# Patient Record
Sex: FEMALE | URBAN - METROPOLITAN AREA
[De-identification: names, ages, dates, MRNs, and addresses within clinical notes are randomized per-mention and may not be internally consistent; named-entity substitution may affect disease eponyms.]

---

## 2021-06-16 ENCOUNTER — APPOINTMENT (RX ONLY)
Dept: URBAN - METROPOLITAN AREA CLINIC 55 | Facility: CLINIC | Age: 46
Setting detail: DERMATOLOGY
End: 2021-06-16

## 2021-06-16 DIAGNOSIS — Z41.9 ENCOUNTER FOR PROCEDURE FOR PURPOSES OTHER THAN REMEDYING HEALTH STATE, UNSPECIFIED: ICD-10-CM

## 2021-06-16 PROCEDURE — ? COSMETIC CONSULTATION: GENERAL

## 2021-06-16 PROCEDURE — ? BOTOX

## 2021-06-16 NOTE — PROCEDURE: BOTOX
Lot #: [id number]
Show Levator Superior Units: Yes
Additional Area 2 Units: 0
Show Ucl Units: No
Glabellar Complex Units: 20
Additional Area 2 Location: upper lip
Additional Area 3 Location: Chin
Consent: Written consent obtained. Risks include but not limited to lid/brow ptosis, bruising, swelling, diplopia, temporary effect, incomplete chemical denervation.
Dilution (U/0.1 Cc): 4
Additional Area 1 Location: Lateral Brows
Periorbital Skin Units: 16
Detail Level: Detailed
Forehead Units: 5
Expiration Date (Month Year): 9/2023
Post-Care Instructions: Patient instructed to not lie down for 4 hours and limit physical activity for 24 hours.

## 2021-08-24 ENCOUNTER — APPOINTMENT (RX ONLY)
Dept: URBAN - METROPOLITAN AREA CLINIC 55 | Facility: CLINIC | Age: 46
Setting detail: DERMATOLOGY
End: 2021-08-24

## 2021-08-24 DIAGNOSIS — Z41.9 ENCOUNTER FOR PROCEDURE FOR PURPOSES OTHER THAN REMEDYING HEALTH STATE, UNSPECIFIED: ICD-10-CM

## 2021-08-24 PROCEDURE — ? BOTOX

## 2021-08-24 NOTE — PROCEDURE: BOTOX
L Brow Units: 0
Expiration Date (Month Year): 11/2023
Show Anterior Platysmal Band Units: Yes
Show Ucl Units: No
Additional Area 3 Units: 4
Consent: Written consent obtained. Risks include but not limited to lid/brow ptosis, bruising, swelling, diplopia, temporary effect, incomplete chemical denervation.
Detail Level: Detailed
Additional Area 1 Location: Left Lateral Brow
Glabellar Complex Units: 24
Post-Care Instructions: Patient instructed to not lie down for 4 hours and limit physical activity for 24 hours.
Additional Area 3 Location: Chin
Additional Area 2 Location: Upper lip
Periorbital Skin Units: 20
Forehead Units: 5
Lot #: [id number]

## 2021-12-02 ENCOUNTER — APPOINTMENT (RX ONLY)
Dept: URBAN - METROPOLITAN AREA CLINIC 55 | Facility: CLINIC | Age: 46
Setting detail: DERMATOLOGY
End: 2021-12-02

## 2021-12-02 DIAGNOSIS — Z41.9 ENCOUNTER FOR PROCEDURE FOR PURPOSES OTHER THAN REMEDYING HEALTH STATE, UNSPECIFIED: ICD-10-CM

## 2021-12-02 PROCEDURE — ? BOTOX

## 2021-12-02 PROCEDURE — ? ICON IPL

## 2021-12-02 ASSESSMENT — LOCATION SIMPLE DESCRIPTION DERM: LOCATION SIMPLE: NOSE

## 2021-12-02 ASSESSMENT — LOCATION ZONE DERM: LOCATION ZONE: NOSE

## 2021-12-02 ASSESSMENT — LOCATION DETAILED DESCRIPTION DERM: LOCATION DETAILED: NASAL DORSUM

## 2021-12-02 NOTE — PROCEDURE: BOTOX
Forehead Units: 5
Show Additional Area 2: Yes
R Brow Units: 0
Additional Area 3 Units: 4
Show Ucl Units: No
Expiration Date (Month Year): 3/2024
Additional Area 4 Location: upper cutaneous lip
Periorbital Skin Units: 20
Glabellar Complex Units: 24
Consent: Written consent obtained. Risks include but not limited to lid/brow ptosis, bruising, swelling, diplopia, temporary effect, incomplete chemical denervation.
Additional Area 2 Location: Upper lip
Additional Area 1 Location: Left Lateral Brow
Lot #: [id number]
Additional Area 3 Location: Chin
Detail Level: Detailed
Post-Care Instructions: Patient instructed to not lie down for 4 hours and limit physical activity for 24 hours.

## 2021-12-02 NOTE — PROCEDURE: ICON IPL
Contact: Light
Passes: 1
Consent: Verbal consent obtained, risks reviewed including but not limited to crusting, scabbing, blistering, scarring, darker or lighter pigmentary change, bruising, and/or incomplete response.
Energy (Optional- Include Units): 100 J/cm2 / 60 ms
Detail Level: Zone
Pre-Procedure Text: After consent was obtained, the treatment areas were cleansed and treated using the parameters mentioned above.
Energy (Optional- Include Units): 44 J/cm2 / 20 ms
Post-Care Instructions: I reviewed with the patient in detail post-care instructions. Patient should stay away from the sun and wear sun protection until treated areas are fully healed.
External Cooling Fan Speed: 5
Anesthesia Volume In Cc: 0
Anesthesia Type: 1% lidocaine with epinephrine

## 2022-04-01 ENCOUNTER — APPOINTMENT (RX ONLY)
Dept: URBAN - METROPOLITAN AREA CLINIC 55 | Facility: CLINIC | Age: 47
Setting detail: DERMATOLOGY
End: 2022-04-01

## 2022-04-01 DIAGNOSIS — Z41.9 ENCOUNTER FOR PROCEDURE FOR PURPOSES OTHER THAN REMEDYING HEALTH STATE, UNSPECIFIED: ICD-10-CM

## 2022-04-01 PROCEDURE — ? FILLERS

## 2022-04-01 PROCEDURE — ? BOTOX

## 2022-04-01 NOTE — PROCEDURE: BOTOX
Show Orbicularis Oculi Units: Yes
Nasal Root Units: 0
Show Lcl Units: No
Expiration Date (Month Year): 3/2024
Additional Area 1 Location: Left Lateral Brow
Detail Level: Detailed
Additional Area 2 Location: Upper lip
Additional Area 4 Location: upper cutaneous lip
Additional Area 3 Location: Chin
Forehead Units: 5
Post-Care Instructions: Patient instructed to not lie down for 4 hours and limit physical activity for 24 hours.
Lot #: [id number]
Consent: Written consent obtained. Risks include but not limited to lid/brow ptosis, bruising, swelling, diplopia, temporary effect, incomplete chemical denervation.
Additional Area 3 Units: 4
Periorbital Skin Units: 20
Glabellar Complex Units: 24

## 2022-04-01 NOTE — PROCEDURE: FILLERS
Nasolabial Folds Filler Volume In Cc: 0
Use Map Statement For Sites (Optional): No
Include Cannula Size?: 27G
Include Cannula Information In Note?: Yes
Expiration Date (Month Year): 5/31/2024
Expiration Date (Month Year): 8/31/2024
Additional Area 1 Location: lip body
Lot #: [id number]
Lot #: [id number]
Filler: Restylane Defyne
Anesthesia Volume In Cc: 0.2
Anesthesia Type: 1% lidocaine with epinephrine
Expiration Date (Month Year): 2/28/2023
Consent: Verbal consent obtained. Risks include but not limited to bruising, beading, irregular texture, ulceration, infection, allergic reaction, scar formation, incomplete augmentation, temporary nature, procedural pain.
Additional Anesthesia Volume In Cc: 6
Post-Care Instructions: Patient instructed to apply ice to reduce swelling.
Lot #: [id number]
Detail Level: Detailed
Filler: Restylane Contour
Map Statment: See Attach Map for Complete Details

## 2022-06-28 ENCOUNTER — APPOINTMENT (RX ONLY)
Dept: URBAN - METROPOLITAN AREA CLINIC 55 | Facility: CLINIC | Age: 47
Setting detail: DERMATOLOGY
End: 2022-06-28

## 2022-06-28 DIAGNOSIS — Z41.9 ENCOUNTER FOR PROCEDURE FOR PURPOSES OTHER THAN REMEDYING HEALTH STATE, UNSPECIFIED: ICD-10-CM

## 2022-06-28 PROCEDURE — ? FILLERS

## 2022-06-28 PROCEDURE — ? BOTOX

## 2022-06-28 NOTE — PROCEDURE: BOTOX
[facility] Units: 0
Show Right And Left Pupillary Line Units: No
Show Lateral Platysmal Band Units: Yes
Consent: Written consent obtained. Risks include but not limited to lid/brow ptosis, bruising, swelling, diplopia, temporary effect, incomplete chemical denervation.
Additional Area 1 Location: Right Lateral Brow
Additional Area 3 Units: 4
Periorbital Skin Units: 20
Detail Level: Detailed
Post-Care Instructions: Patient instructed to not lie down for 4 hours and limit physical activity for 24 hours.
Expiration Date (Month Year): 4/2024
Additional Area 4 Location: upper lip
Additional Area 3 Location: Chin
Glabellar Complex Units: 24
Lot #: [id number]
Additional Area 5 Location: lower eye lids
Forehead Units: 5

## 2022-06-28 NOTE — PROCEDURE: FILLERS
Lot #: [id number]
Additional Area 4 Volume In Cc: 0
Post-Care Instructions: Patient instructed to apply ice to reduce swelling.
Include Cannula Information In Note?: Yes
Anesthesia Volume In Cc: 0.5
Include Cannula Size?: 27G
Map Statment: See Attach Map for Complete Details
Lot #: [id number]
Aspiration Statement: Aspiration was performed prior to injecting site with filler.
Expiration Date (Month Year): 11/11/2022
Lot #: [id number]
Additional Anesthesia Volume In Cc: 6
Consent: Verbal consent obtained. Risks include but not limited to bruising, beading, irregular texture, ulceration, infection, allergic reaction, scar formation, incomplete augmentation, temporary nature, procedural pain.
Use Map Statement For Sites (Optional): No
Anesthesia Type: 1% lidocaine with epinephrine
Expiration Date (Month Year): 5/31/2024
Detail Level: Detailed
Expiration Date (Month Year): 9/30/2024
Filler: Restylane Kysse
Additional Area 1 Location: lip body

## 2022-08-25 ENCOUNTER — APPOINTMENT (RX ONLY)
Dept: URBAN - METROPOLITAN AREA CLINIC 55 | Facility: CLINIC | Age: 47
Setting detail: DERMATOLOGY
End: 2022-08-25

## 2022-08-25 DIAGNOSIS — Z41.9 ENCOUNTER FOR PROCEDURE FOR PURPOSES OTHER THAN REMEDYING HEALTH STATE, UNSPECIFIED: ICD-10-CM

## 2022-08-25 PROCEDURE — ? BOTOX

## 2022-12-09 ENCOUNTER — APPOINTMENT (RX ONLY)
Dept: URBAN - METROPOLITAN AREA CLINIC 55 | Facility: CLINIC | Age: 47
Setting detail: DERMATOLOGY
End: 2022-12-09

## 2022-12-09 DIAGNOSIS — Z41.9 ENCOUNTER FOR PROCEDURE FOR PURPOSES OTHER THAN REMEDYING HEALTH STATE, UNSPECIFIED: ICD-10-CM

## 2022-12-09 PROCEDURE — ? COSMETIC CONSULTATION: GENERAL

## 2022-12-09 PROCEDURE — ? SCULPTRA

## 2022-12-09 PROCEDURE — ? INVENTORY

## 2022-12-09 PROCEDURE — ? BOTOX

## 2022-12-09 NOTE — PROCEDURE: BOTOX
[facility] Units: 0
Show Additional Area 3: Yes
Forehead Units: 5
Additional Area 1 Location: upper lip
Show Ucl Units: No
Detail Level: Detailed
Glabellar Complex Units: 24
Additional Area 2 Location: chin
Depressor Anguli Oris Units: 6
Show Inventory Tab: Show
Additional Area 2 Units: 4
Consent: Verbal consent obtained. Risks include but not limited to lid/brow ptosis, bruising, swelling, diplopia, temporary effect, incomplete chemical denervation.
Post-Care Instructions: Patient instructed to not lie down for 4 hours and limit physical activity for 24 hours.
Periorbital Skin Units: 20

## 2022-12-09 NOTE — PROCEDURE: SCULPTRA
Show Jawline Volume?: Yes
Treatment Number: 1
Injection Technique: The Sculptra was injected to the areas shown with a 25g cannula after prepping the skin with alcohol and/or chlorhexidine and providing appropriate anesthesia.
Lateral Face Sculptra Filler Volume In Cc: 0
Dilution Method: The Sculptra was reconstituted with 8cc of sterile water and 2cc 2% plain lidocaine for each vial for a total volume of 10 cc for each vial.
Show Right And Left Middle Malar Volume?: No
Consent obtained.
Anesthesia Volume In Cc: 0.5
Detail Level: Detailed
Additional Anesthesia Volume In Cc: 6
Post-Care Instructions: Aftercare instructions were provided to the patient.
Show Inventory Tab: Show
Map Statement: See Attached Map for Complete Details.
Anesthesia Type: 1% lidocaine with epinephrine

## 2023-03-08 ENCOUNTER — APPOINTMENT (RX ONLY)
Dept: URBAN - METROPOLITAN AREA CLINIC 55 | Facility: CLINIC | Age: 48
Setting detail: DERMATOLOGY
End: 2023-03-08

## 2023-03-08 DIAGNOSIS — Z41.9 ENCOUNTER FOR PROCEDURE FOR PURPOSES OTHER THAN REMEDYING HEALTH STATE, UNSPECIFIED: ICD-10-CM

## 2023-03-08 PROCEDURE — ? SCULPTRA

## 2023-03-08 PROCEDURE — ? BOTOX

## 2023-03-08 NOTE — PROCEDURE: SCULPTRA
Show Temples Volume?: Yes
Jawline Sculptra Filler Volume In Cc: 0
Post-Care Instructions: Aftercare instructions were provided to the patient.
Map Statement: See Attached Map for Complete Details.
Show Right And Left Jawline Volume?: No
Vials Reconstituted (Required For Inventory): 1
Dilution Method: The Sculptra was reconstituted with 8cc of sterile water and 2cc 2% plain lidocaine for each vial for a total volume of 10 cc for each vial.
Anesthesia Volume In Cc: 0.5
Injection Technique: The Sculptra was injected to the areas shown with a 25g cannula after prepping the skin with alcohol and/or chlorhexidine and providing appropriate anesthesia.
Detail Level: Detailed
Consent obtained.
Anesthesia Type: 1% lidocaine with epinephrine
Additional Anesthesia Volume In Cc: 6
Show Inventory Tab: Show

## 2023-03-08 NOTE — PROCEDURE: BOTOX
L Brow Units: 0
Show Anterior Platysmal Band Units: Yes
Show Right And Left Periorbital Units: No
Post-Care Instructions: Patient instructed to not lie down for 4 hours and limit physical activity for 24 hours.
Additional Area 2 Units: 4
Additional Area 2 Location: chin
Periorbital Skin Units: 20
Additional Area 1 Location: upper lip
Incrementing Botox Units: By 0.5 Units
Depressor Anguli Oris Units: 6
Detail Level: Detailed
Glabellar Complex Units: 24
Forehead Units: 5
Show Inventory Tab: Show
Consent: Verbal consent obtained. Risks include but not limited to lid/brow ptosis, bruising, swelling, diplopia, temporary effect, incomplete chemical denervation.

## 2023-06-13 ENCOUNTER — APPOINTMENT (RX ONLY)
Dept: URBAN - METROPOLITAN AREA CLINIC 55 | Facility: CLINIC | Age: 48
Setting detail: DERMATOLOGY
End: 2023-06-13

## 2023-06-13 DIAGNOSIS — Z41.9 ENCOUNTER FOR PROCEDURE FOR PURPOSES OTHER THAN REMEDYING HEALTH STATE, UNSPECIFIED: ICD-10-CM

## 2023-06-13 PROCEDURE — ? FILLERS

## 2023-06-13 PROCEDURE — ? INVENTORY

## 2023-06-13 PROCEDURE — ? BOTOX

## 2023-06-13 NOTE — PROCEDURE: FILLERS
Nasolabial Folds Filler Volume In Cc: 0
Filler: Restylane Contour
Post-Care Instructions: After the procedure, patient instructed to apply ice to reduce swelling.
Use Map Statement For Sites (Optional): No
Map Statment: See Attach Map for Complete Details
Filler: Restylane Defyne
Include Cannula Information In Note?: Yes
Anesthesia Type: 1% lidocaine with epinephrine
Anesthesia Volume In Cc: 0.5
Include Cannula Size?: 25G
Additional Anesthesia Volume In Cc: 6
Detail Level: Detailed
Consent: Written consent obtained. Risks include but not limited to bruising, beading, irregular texture, ulceration, infection, allergic reaction, scar formation, incomplete augmentation, temporary nature, and procedural pain.

## 2023-06-13 NOTE — PROCEDURE: BOTOX
Right Periorbital Skin Units: 0
Show Additional Area 5: Yes
Additional Area 1 Location: chin
Incrementing Botox Units: By 0.5 Units
Show Ucl Units: No
Consent: Verbal consent obtained. Risks include but not limited to lid/brow ptosis, bruising, swelling, diplopia, temporary effect, incomplete chemical denervation.
Detail Level: Detailed
Additional Area 1 Units: 4
Additional Area 3 Location: lower lip
Post-Care Instructions: Patient instructed to not lie down for 4 hours and limit physical activity for 24 hours.
Forehead Units: 5
Glabellar Complex Units: 24
Show Inventory Tab: Show
Depressor Anguli Oris Units: 6
Periorbital Skin Units: 20

## 2023-10-26 ENCOUNTER — APPOINTMENT (RX ONLY)
Dept: URBAN - METROPOLITAN AREA CLINIC 55 | Facility: CLINIC | Age: 48
Setting detail: DERMATOLOGY
End: 2023-10-26

## 2023-10-26 DIAGNOSIS — Z41.9 ENCOUNTER FOR PROCEDURE FOR PURPOSES OTHER THAN REMEDYING HEALTH STATE, UNSPECIFIED: ICD-10-CM

## 2023-10-26 PROCEDURE — ? SCULPTRA

## 2023-10-26 PROCEDURE — ? INVENTORY

## 2023-10-26 PROCEDURE — ? DAXXIFY

## 2023-10-26 NOTE — PROCEDURE: SCULPTRA
Show Right And Left Middle Malar Volume?: No
Consent obtained.
Left Forehead Filler Volume In Cc: 0
Show Mental Crease Volume?: Yes
Detail Level: Detailed
Show Inventory Tab: Show
Additional Anesthesia Volume In Cc: 6
Post-Care Instructions: Aftercare instructions were provided to the patient.
Map Statement: See Attached Map for Complete Details.
Vials Reconstituted (Required For Inventory): 1
Dilution Method: The Sculptra was reconstituted with 8cc of sterile water and 2cc 2% plain lidocaine for each vial for a total volume of 10 cc for each vial.
Anesthesia Type: 1% lidocaine with epinephrine
Anesthesia Volume In Cc: 0.5
Injection Technique: The Sculptra was injected to the areas shown with a 25g cannula after prepping the skin with alcohol and/or chlorhexidine and providing appropriate anesthesia.

## 2023-10-26 NOTE — PROCEDURE: DAXXIFY
Show Mentalis Units: No
Show Glabellar Units: Yes
Periorbital Skin Units: 48
[place] Units: 0
Dilution (U/0.1 Cc): 8
Additional Area 1 Location: Chin
Forehead Units: 22
Consent obtained. Risks include but not limited to lid/brow ptosis, bruising, swelling, diplopia, temporary effect, incomplete chemical denervation.
Detail Level: Detailed
Post-Care Instructions: Patient instructed to not lie down for 4 hours and limit physical activity for 24 hours.
Depressor Anguli Oris Units: 12
Show Inventory Tab: Hide

## 2024-05-13 ENCOUNTER — APPOINTMENT (RX ONLY)
Dept: URBAN - METROPOLITAN AREA CLINIC 55 | Facility: CLINIC | Age: 49
Setting detail: DERMATOLOGY
End: 2024-05-13

## 2024-05-13 DIAGNOSIS — Z41.9 ENCOUNTER FOR PROCEDURE FOR PURPOSES OTHER THAN REMEDYING HEALTH STATE, UNSPECIFIED: ICD-10-CM

## 2024-05-13 PROCEDURE — ? INVENTORY

## 2024-05-13 PROCEDURE — ? DAXXIFY

## 2024-05-13 NOTE — PROCEDURE: DAXXIFY
Show Mentalis Units: No
Show Glabellar Units: Yes
Periorbital Skin Units: 48
Masseter Units: 0
Dilution (U/0.1 Cc): 8
Additional Area 1 Location: Chin
Forehead Units: 22
Consent obtained. Risks include but not limited to lid/brow ptosis, bruising, swelling, diplopia, temporary effect, incomplete chemical denervation.
Detail Level: Detailed
Post-Care Instructions: Patient instructed to not lie down for 4 hours and limit physical activity for 24 hours.
Depressor Anguli Oris Units: 12
Show Inventory Tab: Hide
Bill Summary Price Listed Below, Or Bill Total Of Units X Price Per Unit?: Bill Summary Price Below

## 2024-08-20 ENCOUNTER — RX ONLY (OUTPATIENT)
Age: 49
Setting detail: RX ONLY
End: 2024-08-20

## 2024-08-20 ENCOUNTER — APPOINTMENT (RX ONLY)
Dept: URBAN - METROPOLITAN AREA CLINIC 55 | Facility: CLINIC | Age: 49
Setting detail: DERMATOLOGY
End: 2024-08-20

## 2024-08-20 DIAGNOSIS — Z41.9 ENCOUNTER FOR PROCEDURE FOR PURPOSES OTHER THAN REMEDYING HEALTH STATE, UNSPECIFIED: ICD-10-CM

## 2024-08-20 PROCEDURE — ? INVENTORY

## 2024-08-20 PROCEDURE — ? SCULPTRA

## 2024-08-20 PROCEDURE — ? DAXXIFY

## 2024-08-20 NOTE — PROCEDURE: SCULPTRA
Show Fourth Additional Location?: Yes
Injection Technique: The Sculptra was injected to the areas shown with a 25g cannula after prepping the skin with alcohol and/or chlorhexidine and providing appropriate anesthesia.
Left Pa Filler Volume In Cc: 0
Show Right And Left Mmc Volume?: No
Anesthesia Volume In Cc: 0.5
Consent obtained.
Additional Anesthesia Volume In Cc: 6
Map Statement: See Attached Map for Complete Details.
Detail Level: Detailed
Vials Reconstituted (Required For Inventory): 1
Post-Care Instructions: Aftercare instructions were provided to the patient.
Show Inventory Tab: Show
Anesthesia Type: 1% lidocaine with epinephrine
Dilution Method: The Sculptra was reconstituted with 8cc of sterile water and 2cc 2% plain lidocaine for each vial for a total volume of 10 cc for each vial.

## 2024-08-20 NOTE — PROCEDURE: DAXXIFY
L Brow Units: 0
Show Inventory Tab: Hide
Show Periorbital Units: Yes
Consent obtained. Risks include but not limited to lid/brow ptosis, bruising, swelling, diplopia, temporary effect, incomplete chemical denervation.
Show Ucl Units: No
Dilution (U/0.1 Cc): 8
Post-Care Instructions: Patient instructed to not lie down for 4 hours and limit physical activity for 24 hours.
Periorbital Skin Units: 48
Bill Summary Price Listed Below, Or Bill Total Of Units X Price Per Unit?: Bill Summary Price Below
Additional Area 1 Location: chin
Forehead Units: 12
Detail Level: Detailed

## 2024-10-10 ENCOUNTER — APPOINTMENT (RX ONLY)
Dept: URBAN - METROPOLITAN AREA CLINIC 55 | Facility: CLINIC | Age: 49
Setting detail: DERMATOLOGY
End: 2024-10-10

## 2024-10-10 DIAGNOSIS — Z41.9 ENCOUNTER FOR PROCEDURE FOR PURPOSES OTHER THAN REMEDYING HEALTH STATE, UNSPECIFIED: ICD-10-CM

## 2024-10-10 PROCEDURE — ? ICON IPL

## 2024-10-10 PROCEDURE — ? FILLERS

## 2024-10-10 PROCEDURE — ? INVENTORY

## 2024-10-10 ASSESSMENT — LOCATION ZONE DERM
LOCATION ZONE: FACE
LOCATION ZONE: NOSE

## 2024-10-10 ASSESSMENT — LOCATION SIMPLE DESCRIPTION DERM
LOCATION SIMPLE: RIGHT CHEEK
LOCATION SIMPLE: LEFT CHEEK
LOCATION SIMPLE: NOSE

## 2024-10-10 ASSESSMENT — LOCATION DETAILED DESCRIPTION DERM
LOCATION DETAILED: NASAL DORSUM
LOCATION DETAILED: RIGHT INFERIOR CENTRAL MALAR CHEEK
LOCATION DETAILED: LEFT INFERIOR CENTRAL MALAR CHEEK

## 2024-10-10 NOTE — PROCEDURE: ICON IPL
Length Of Topical Anesthesia Application (Optional): 60 minutes
Post-Care Instructions: I reviewed with the patient in detail post-care instructions. Patient should avoid sun exposure and to wear sun protection until treated areas are fully healed.
Energy (Optional- Include Units): 44 J/cm2
External Cooling Fan Speed: 5
Passes: 1
Detail Level: Zone
Anesthesia Volume In Cc: 0
Pulse Duration (Optional- Include Units): 20 ms
Topical Anesthesia?: 20% benzocaine, 8% lidocaine, 4% tetracaine
Contact: Light
Pre-Procedure Text: After consent was obtained, the treatment areas were cleansed and treated using the parameters mentioned above.
Consent obtained, risks reviewed.

## 2024-10-10 NOTE — PROCEDURE: FILLERS
[place] Filler Volume In Cc: 0
Filler: RHA 2
Anesthesia Type: 1% lidocaine with epinephrine
Include Cannula Information In Note?: Yes
Anesthesia Volume In Cc: 0.5
Consent: Verbal consent obtained, risks reviewed.
Post-Care Instructions: After the procedure, patient instructed to apply ice to reduce swelling.
Include Cannula Size?: 25G
Additional Anesthesia Volume In Cc: 6
Detail Level: Detailed
Use Map Statement For Sites (Optional): No
Filler: RHA 4
Map Statment: See Attach Map for Complete Details
Filler: RHA 3

## 2025-02-18 ENCOUNTER — APPOINTMENT (OUTPATIENT)
Dept: URBAN - METROPOLITAN AREA CLINIC 55 | Facility: CLINIC | Age: 50
Setting detail: DERMATOLOGY
End: 2025-02-18

## 2025-02-18 DIAGNOSIS — Z41.9 ENCOUNTER FOR PROCEDURE FOR PURPOSES OTHER THAN REMEDYING HEALTH STATE, UNSPECIFIED: ICD-10-CM

## 2025-02-18 PROCEDURE — ? DAXXIFY

## 2025-02-18 PROCEDURE — ? INVENTORY

## 2025-02-18 NOTE — PROCEDURE: DAXXIFY
Left Periorbital Skin Units: 0
Show Lateral Platysmal Band Units: Yes
Forehead Units: 16
Bill Summary Price Listed Below, Or Bill Total Of Units X Price Per Unit?: Bill Summary Price Below
Detail Level: Detailed
Additional Area 1 Location: lip
Show Ucl Units: No
Glabellar Complex Units: 40
Show Inventory Tab: Hide
Additional Area 2 Location: chin
Depressor Anguli Oris Units: 12
Additional Area 2 Units: 8
Periorbital Skin Units: 48
Dilution (U/0.1 Cc): 4
Consent: Written consent obtained. Risks include but not limited to lid/brow ptosis, bruising, swelling, diplopia, temporary effect, incomplete chemical denervation.
Post-Care Instructions: Patient instructed to not lie down for 4 hours and limit physical activity for 24 hours.

## 2025-06-18 ENCOUNTER — APPOINTMENT (OUTPATIENT)
Dept: URBAN - METROPOLITAN AREA CLINIC 55 | Facility: CLINIC | Age: 50
Setting detail: DERMATOLOGY
End: 2025-06-18

## 2025-06-18 DIAGNOSIS — Z41.9 ENCOUNTER FOR PROCEDURE FOR PURPOSES OTHER THAN REMEDYING HEALTH STATE, UNSPECIFIED: ICD-10-CM

## 2025-06-18 PROCEDURE — ? INVENTORY

## 2025-06-18 PROCEDURE — ? DAXXIFY

## 2025-06-18 NOTE — PROCEDURE: DAXXIFY
Additional Area 4 Units: 0
Show Topical Anesthesia: Yes
Show Inventory Tab: Hide
Depressor Anguli Oris Units: 12
Additional Area 2 Location: Chin
Show Right And Left Periorbital Units: No
Periorbital Skin Units: 48
Additional Area 2 Units: 8
Consent: Written consent obtained. Risks include but not limited to lid/brow ptosis, bruising, swelling, diplopia, temporary effect, incomplete chemical denervation.
Detail Level: Detailed
Bill Summary Price Listed Below, Or Bill Total Of Units X Price Per Unit?: Bill Summary Price Below
Post-Care Instructions: Patient instructed to not lie down for 4 hours and limit physical activity for 24 hours.
Forehead Units: 16
Additional Area 4 Location: Nasalis
Additional Area 1 Location: lip
Dilution (U/0.1 Cc): 4
Glabellar Complex Units: 40